# Patient Record
Sex: MALE | Race: BLACK OR AFRICAN AMERICAN
[De-identification: names, ages, dates, MRNs, and addresses within clinical notes are randomized per-mention and may not be internally consistent; named-entity substitution may affect disease eponyms.]

---

## 2018-12-27 ENCOUNTER — HOSPITAL ENCOUNTER (EMERGENCY)
Dept: HOSPITAL 35 - ER | Age: 63
Discharge: NURSING, REHAB OR LONG-TERM CARE | End: 2018-12-27
Payer: COMMERCIAL

## 2018-12-27 VITALS — WEIGHT: 295 LBS | HEIGHT: 71 IN | BODY MASS INDEX: 41.3 KG/M2

## 2018-12-27 VITALS — DIASTOLIC BLOOD PRESSURE: 61 MMHG | SYSTOLIC BLOOD PRESSURE: 116 MMHG

## 2018-12-27 DIAGNOSIS — R55: Primary | ICD-10-CM

## 2018-12-27 LAB
ANION GAP BLD CALC-SCNC: 13 MMOL/L (ref 7–16)
APTT BLD: 30.8 SECONDS (ref 24.5–32.8)
BUN BLD-MCNC: 21 MG/DL (ref 7–18)
CHLORIDE BLD-SCNC: 101 MMOL/L (ref 98–107)
CO2 SERPL-SCNC: 30 MMOL/L (ref 21–32)
CREAT SERPL-MCNC: 6.5 MG/DL (ref 0.6–1.3)
GLUCOSE BLD-MCNC: 152 MG/DL (ref 70–99)
HCT VFR BLD AUTO: 33 % (ref 42–52)
HGB BLD-MCNC: 11.2 G/DL (ref 14–18)
INR PPP: 1
POC CA IONIZED: 5 MG/DL (ref 4.5–5.3)
POTASSIUM SERPL-SCNC: 4 MMOL/L (ref 3.5–5.1)
PROTHROMBIN TIME: 10.8 SECONDS (ref 9.3–11.4)
SODIUM SERPL-SCNC: 139 MMOL/L (ref 136–145)

## 2019-01-22 ENCOUNTER — HOSPITAL ENCOUNTER (INPATIENT)
Dept: HOSPITAL 35 - ER | Age: 64
LOS: 2 days | Discharge: NURSING, REHAB OR LONG-TERM CARE | DRG: 314 | End: 2019-01-24
Attending: INTERNAL MEDICINE | Admitting: INTERNAL MEDICINE
Payer: COMMERCIAL

## 2019-01-22 VITALS — BODY MASS INDEX: 42.12 KG/M2 | HEIGHT: 60.98 IN | WEIGHT: 223.09 LBS

## 2019-01-22 VITALS — DIASTOLIC BLOOD PRESSURE: 64 MMHG | SYSTOLIC BLOOD PRESSURE: 110 MMHG

## 2019-01-22 VITALS — DIASTOLIC BLOOD PRESSURE: 68 MMHG | SYSTOLIC BLOOD PRESSURE: 120 MMHG

## 2019-01-22 VITALS — SYSTOLIC BLOOD PRESSURE: 96 MMHG | DIASTOLIC BLOOD PRESSURE: 54 MMHG

## 2019-01-22 VITALS — DIASTOLIC BLOOD PRESSURE: 57 MMHG | SYSTOLIC BLOOD PRESSURE: 97 MMHG

## 2019-01-22 VITALS — SYSTOLIC BLOOD PRESSURE: 96 MMHG | DIASTOLIC BLOOD PRESSURE: 60 MMHG

## 2019-01-22 VITALS — SYSTOLIC BLOOD PRESSURE: 115 MMHG | DIASTOLIC BLOOD PRESSURE: 74 MMHG

## 2019-01-22 VITALS — DIASTOLIC BLOOD PRESSURE: 26 MMHG | SYSTOLIC BLOOD PRESSURE: 50 MMHG

## 2019-01-22 DIAGNOSIS — I95.3: ICD-10-CM

## 2019-01-22 DIAGNOSIS — Z88.8: ICD-10-CM

## 2019-01-22 DIAGNOSIS — E11.649: ICD-10-CM

## 2019-01-22 DIAGNOSIS — N18.6: ICD-10-CM

## 2019-01-22 DIAGNOSIS — N40.0: ICD-10-CM

## 2019-01-22 DIAGNOSIS — J44.9: ICD-10-CM

## 2019-01-22 DIAGNOSIS — I12.0: ICD-10-CM

## 2019-01-22 DIAGNOSIS — E11.22: ICD-10-CM

## 2019-01-22 DIAGNOSIS — E87.8: ICD-10-CM

## 2019-01-22 DIAGNOSIS — E78.5: ICD-10-CM

## 2019-01-22 DIAGNOSIS — Z79.899: ICD-10-CM

## 2019-01-22 DIAGNOSIS — I95.89: Primary | ICD-10-CM

## 2019-01-22 LAB
ALBUMIN SERPL-MCNC: 2.8 G/DL (ref 3.4–5)
ALT SERPL-CCNC: 10 U/L (ref 30–65)
ANION GAP SERPL CALC-SCNC: 7 MMOL/L (ref 7–16)
ANISOCYTOSIS BLD QL SMEAR: (no result)
AST SERPL-CCNC: 15 U/L (ref 15–37)
BASOPHILS NFR BLD AUTO: 1.2 % (ref 0–2)
BILIRUB SERPL-MCNC: 0.3 MG/DL
BUN SERPL-MCNC: 25 MG/DL (ref 7–18)
CALCIUM SERPL-MCNC: 10.6 MG/DL (ref 8.5–10.1)
CHLORIDE SERPL-SCNC: 100 MMOL/L (ref 98–107)
CO2 SERPL-SCNC: 29 MMOL/L (ref 21–32)
CREAT SERPL-MCNC: 7.8 MG/DL (ref 0.7–1.3)
EOSINOPHIL NFR BLD: 2.9 % (ref 0–3)
ERYTHROCYTE [DISTWIDTH] IN BLOOD BY AUTOMATED COUNT: 19.9 % (ref 10.5–14.5)
GLUCOSE SERPL-MCNC: 132 MG/DL (ref 74–106)
GRANULOCYTES NFR BLD MANUAL: 58.3 % (ref 36–66)
HCT VFR BLD CALC: 39.1 % (ref 42–52)
HGB BLD-MCNC: 12.3 GM/DL (ref 14–18)
LYMPHOCYTES NFR BLD AUTO: 30.5 % (ref 24–44)
MCH RBC QN AUTO: 24.8 PG (ref 26–34)
MCHC RBC AUTO-ENTMCNC: 31.4 G/DL (ref 28–37)
MCV RBC: 78.7 FL (ref 80–100)
MONOCYTES NFR BLD: 7.1 % (ref 1–8)
NEUTROPHILS # BLD: 3.8 THOU/UL (ref 1.4–8.2)
PLATELET # BLD: 251 THOU/UL (ref 150–400)
POTASSIUM SERPL-SCNC: 4.4 MMOL/L (ref 3.5–5.1)
PROT SERPL-MCNC: 7.5 G/DL (ref 6.4–8.2)
RBC # BLD AUTO: 4.97 MIL/UL (ref 4.5–6)
SODIUM SERPL-SCNC: 136 MMOL/L (ref 136–145)
TROPONIN I SERPL-MCNC: <0.06 NG/ML (ref ?–0.06)
WBC # BLD AUTO: 6.6 THOU/UL (ref 4–11)

## 2019-01-22 PROCEDURE — 10879: CPT

## 2019-01-22 PROCEDURE — 32100 EXPLORATION OF CHEST: CPT

## 2019-01-22 NOTE — HC
[facility]
[address]
[place]   [address]                     CONSULTATION                  
_______________________________________________________________________________
 
Name:       WHITNEY NEAL                Room #:         362-P       ADM IN  
M.R.#:      [id number]                       Account #:      [id number]  
Admission:  01/22/19    Attend Phys:    Justin Blackman MD    
Discharge:              Date of Birth:  [birth year]  
                                                          Report #: [id number]
                                                                    [id number]   
_______________________________________________________________________________
THIS REPORT FOR:   //name//                          
 
CC: Justin Blackman
 
REASON FOR CONSULTATION:  End-stage renal disease.
 
REASON FOR PRESENTATION:  Hypertension.
 
HISTORY OF PRESENT ILLNESS:  The patient is a 63-year-old with past medical
history of diabetes mellitus and hypertension.  He tells me that he was started
on dialysis a few months ago while at [facility].  He does not really
recall what he was told about the cause of his end-stage renal disease; however,
I am assuming that this is due to diabetes mellitus and hypertension.  He is
utilizing a left IJ catheter.  He suffers from hypotensive episodes after his
dialysis and had what seems to be a fall, and unresponsive episode.  He was
brought to further evaluate to the Emergency Room and was found to be in extreme
hypotension with his blood pressure all the way down to 60/31.  He required
fluid resuscitation.  I am being consulted to manage his end-stage renal
disease.
 
PAST MEDICAL HISTORY:
1.  End-stage renal disease.
2.  Diabetes mellitus.
3.  Hypertension.
4.  History of BPH.
5.  COPD.
6.  Hyperlipidemia.
7.  First left IJ catheter placement.
 
MEDICATIONS:
1.  Atorvastatin.
2.  Insulin.
3.  Midodrine.
4.  Hydrocodone.
5.  Folic acid.
 
ALLERGIES:  PORT AND PORCINE CONTAINING PRODUCTS.
 
SOCIAL HISTORY:  Resides in the [facility].  No drug or alcohol abuse.
 
REVIEW OF SYSTEMS:
GENERAL:  No fever or chills, but significant for weakness.
CARDIOVASCULAR:  No chest pain.
PULMONARY:  No cough or hemoptysis.
GASTROINTESTINAL:  No nausea or vomiting.
GENITOURINARY:  No frequency, no urgency.
 
 
 
[facility]
[address] [place]   [address]                     CONSULTATION                  
_______________________________________________________________________________
 
Name:       WHITNEY NEAL                Room #:         362-P       [facility] [place]  
.R.#:      [id number]                       Account #:      [id number]  
Admission:  01/22/19    Attend Phys:    Justin Blackman MD    
Discharge:              Date of Birth:  [birth year]  
                                                          Report #: [id number]
                                                                    [id number]   
_______________________________________________________________________________
NEUROLOGICAL:  As per the history of present illness.
 
PHYSICAL EXAMINATION:
VITAL SIGNS:  Temperature 36.9, blood pressure 121/56.
HEAD AND NECK:  No jugular venous distension.  Left IJ catheter present.
CHEST:  No crackles.
CARDIOVASCULAR:  No rub.
ABDOMEN:  Soft, nontender.
LOWER EXTREMITIES:  No edema.
 
LABORATORY VALUES:  Reviewed.  Hemoglobin 12.3.  Sodium 136, potassium 4.4, BUN
25, creatinine 7.8.
 
ASSESSMENT, IMPRESSION AND PLAN:
1.  End-stage renal disease.
2.  Hypotension related to aggressive ultrafiltration.
3.  Diabetes mellitus.
4.  Hypertension.
5.  We will arrange for the patient to have his usual hemodialysis today. 
Orders are in.  No aggressive ultrafiltration.  Back off his blood pressure
medication.  Okay to discharge after dialysis.
 
 
 
 
 
 
 
 
 
 
 
 
 
 
 
 
 
 
 
 
 
 
 
                         
   By:                               
                   
D: 01/23/19 0958                           _____________________________________
T: 01/23/19 1126                           Jaguar Philippe MD            /nt

## 2019-01-22 NOTE — NUR
PT ARRIVED TO FLOOR AT 1410. A/O X 4. PT BP STABLE ON ARRIVAL. ALBUMIN
INFUSING VIA IV. PT DENIES ANY ACUTE PAIN, ONLY CHRONIC PAIN IN JOINTS PER PT.
SISTER,SONU, UPDATED VIA PHONE. ADMISSION DOCUMENTATION COMPLETE.
SKIN BREAKDOWN UNDER PANIS NOTED ON ARRIVAL AND PICTURE PLACED IN CHART.
BG CHECK ON ARRIVAL AT 61, GIVEN APPLE JUICE AND RECHECKED. PT STABLE.
AWAITING ADMISSION ORDERS VIA DR ANTUNEZ. WILL CONT TO MONITOR AND FOLLOW POC.

## 2019-01-22 NOTE — EKG
[place] [address] [facility] [employer]
[place]  [address]
Phone:  [phone]                    ELECTROCARDIOGRAM REPORT      
_______________________________________________________________________________
 
Name:       ANDRIA NEALANTONIA                Room #:         362-P       ADM IN  
M.R.#:      [id number]     Account #:      [id number]  
Admission:  19    Attend Phys:    Justin Blackman MD    
Discharge:              Date of Birth:  [birth year]  
                                                          Report #: [id number]
   [id number]
_______________________________________________________________________________
THIS REPORT FOR:   //name//                          
 
                         [facility] ED
                                       
Test Date:    2019               Test Time:    10:10:37
Pat Name:     WHITNEY NEAL           Department:   
Patient ID:   [id number]            Room:         362
Gender:       M                        Technician:   park amaro
:          1955               Requested By: Katlyn Pearce
Order Number: [id number] MD:   Dain Delacruz
                                 Measurements
Intervals                              Axis          
Rate:         76                       P:            43
GA:           168                      QRS:          -11
QRSD:         104                      T:            33
QT:           416                                    
QTc:          468                                    
                           Interpretive Statements
Sinus rhythm
Abnormal R-wave progression, late transition
Compared to ECG 2018 10:11:12
No significant changes
 
Electronically Signed On 2019 16:12:08 CST by Dain Delacruz
[email or web address]
 
 
 
 
 
 
 
 
 
 
 
 
 
 
 
 
 
 
  <ELECTRONICALLY SIGNED>
   By: Dain Delacruz MD, [facility]   
  19     1612
D: 19 1010                           _____________________________________
T: 19 1010                           Dain Delacruz MD, [facility]     /EPI

## 2019-01-23 VITALS — DIASTOLIC BLOOD PRESSURE: 65 MMHG | SYSTOLIC BLOOD PRESSURE: 103 MMHG

## 2019-01-23 VITALS — SYSTOLIC BLOOD PRESSURE: 119 MMHG | DIASTOLIC BLOOD PRESSURE: 59 MMHG

## 2019-01-23 VITALS — DIASTOLIC BLOOD PRESSURE: 77 MMHG | SYSTOLIC BLOOD PRESSURE: 109 MMHG

## 2019-01-23 VITALS — DIASTOLIC BLOOD PRESSURE: 56 MMHG | SYSTOLIC BLOOD PRESSURE: 121 MMHG

## 2019-01-23 VITALS — DIASTOLIC BLOOD PRESSURE: 66 MMHG | SYSTOLIC BLOOD PRESSURE: 124 MMHG

## 2019-01-23 VITALS — SYSTOLIC BLOOD PRESSURE: 135 MMHG | DIASTOLIC BLOOD PRESSURE: 91 MMHG

## 2019-01-23 PROCEDURE — 5A1D70Z PERFORMANCE OF URINARY FILTRATION, INTERMITTENT, LESS THAN 6 HOURS PER DAY: ICD-10-PCS | Performed by: INTERNAL MEDICINE

## 2019-01-23 NOTE — NUR
ASSUMED PATIENT CARE AT 0715.  A&OX3-4. NO COMPLAINTS OF PAIN.  PLANNING ON
DIALYSIS TODAY WITH DISCHARGE BACK TO FACILITY FOLLOWING AS LONG AS PATIENT IS
STABLE POST DIALYSIS.  MAX ASSIST.  NEEDS HELP REPOSITIONING IN BED.  NEW RUE
FISTULA WITH POSITIVE BRUIT AND THRILL.

## 2019-01-23 NOTE — NUR
INITIAL ASSESSMENT/DISCHARGE NOTE:
Received high risk nursing referral due to pt being admitted from a nursing
facility. CARL reviewed chart and spoke with nursing. Pt was admitted from
[facility]. Pt with hx of ESRD and does dialysis at [facility]. Pt will d/c back to [facility] after dialysis. CARL met with pt at bedside.
Introduced role of SW. Pt is alert/oriented. Pt is agreeable with plan to
return to [facility] later today. Awaiting dialysis at this time. CARL spoke with
May at [facility] to notify of pt's return. Dialysis flow sheets and
discharge orders/summary will need to be faxed when available. CARL spoke with
pt's sister, Sabrina, via phone to provide update and notify of anticipated
discharge. Sabrina is agreeable with d/c plan. Discharge planner faxed info
to [facility].  Pt will need stretcher van transportation. CARL is following to
assist as needed with discharge planning.
[facility]--Phone: [phone]  Fax: [phone]  or [phone]
[facility]--Phone: [phone]   Fax: [phone]

## 2019-01-23 NOTE — NUR
PATIENT IS ALERT AND ORIENTED WITH A THICK ACCENT. PATIENT IS ANURIC. PATIENT
IS ON DIAYLISIS MWF. PATIENT HAS A RT ARM FISTULA AND LT CHEST DIAYLISIS CATH.
FISTULA IS NEW. PATIENT CANT EAT PORK PER [employer]. PATIENT IS INCONTIENT.
PATIENT IS NSR ON TELE. PATIENT DENIES PAIN. PATIENT IS RESTING COMFORTABLEY
IN BED. WCM. PATIENT IS PROGRESSING TO GOALS.

## 2019-01-23 NOTE — NUR
DP CALLED BRENDA/VLADIMIR CAREY TO LET HER KNOW PATIENT WILL GO
BACK TO  AFTER DIALYSIS LATER TODAY.

## 2019-01-24 VITALS — SYSTOLIC BLOOD PRESSURE: 104 MMHG | DIASTOLIC BLOOD PRESSURE: 68 MMHG

## 2019-01-24 VITALS — DIASTOLIC BLOOD PRESSURE: 58 MMHG | SYSTOLIC BLOOD PRESSURE: 108 MMHG

## 2019-01-24 VITALS — SYSTOLIC BLOOD PRESSURE: 100 MMHG | DIASTOLIC BLOOD PRESSURE: 56 MMHG

## 2019-01-24 VITALS — SYSTOLIC BLOOD PRESSURE: 105 MMHG | DIASTOLIC BLOOD PRESSURE: 90 MMHG

## 2019-01-24 NOTE — NUR
DISCHARGE ORDERS RECEIVED.  PATIENT DISCHARGING TO [facility].  CHART
COPIED PER UNIT SECRETARY.  ORDERS FAXED TO [facility]
ADMISSIONS.  ORDERS VERIFIED RECEIVED.  TRANSPORTATION PER , 1630 HOURS.
SISTER SONU NOTIFIED.  UNIT RN NOTIFIED AND CONTACT NUMBER PROVIDED FOR
REPORT.  UNIT CM/SW AWARE.

## 2019-01-24 NOTE — NUR
DISCHARGE NOTE:
SW reviewed chart and spoke with nursing. Pt was not done with dialysis until
late last night. Pt is medically stable for discharge back to [facility]
today. Discharge planner coordinated and spoke with family. No additional SW
needs identified at this time, but is available to assist should needs arise.

## 2019-01-24 NOTE — NUR
PT IS A/O TIMES FOUR, D/C ORDERS IN PLACE, PT PICKED UP AT 1620, REPPORT
CALLED TO ACCEPTING FACILITY AND ALL QUESTIONS ANSWERED TO ACCEPTING NURSE. PT
PICKED UP WC TRANSPORTS.

## 2019-01-24 NOTE — NUR
Pt. dialyzed last night till approximately 2300 and dialysis RN stated she
pulled out about 1L. Tolerating room air well. Denies any concern at this
time. He slept well during the night. BP stable. Progressing towards care plan
goals.

## 2019-12-26 ENCOUNTER — HOSPITAL ENCOUNTER (OUTPATIENT)
Dept: HOSPITAL 35 - CAT | Age: 64
End: 2019-12-26
Attending: INTERNAL MEDICINE
Payer: COMMERCIAL

## 2019-12-26 DIAGNOSIS — G31.9: Primary | ICD-10-CM

## 2019-12-26 DIAGNOSIS — I67.82: ICD-10-CM

## 2021-04-09 ENCOUNTER — HOSPITAL ENCOUNTER (EMERGENCY)
Dept: HOSPITAL 35 - ER | Age: 66
Discharge: NURSING, REHAB OR LONG-TERM CARE | End: 2021-04-09
Payer: COMMERCIAL

## 2021-04-09 VITALS — WEIGHT: 250 LBS | HEIGHT: 72 IN | BODY MASS INDEX: 33.86 KG/M2

## 2021-04-09 VITALS — DIASTOLIC BLOOD PRESSURE: 71 MMHG | SYSTOLIC BLOOD PRESSURE: 121 MMHG

## 2021-04-09 DIAGNOSIS — Z79.899: ICD-10-CM

## 2021-04-09 DIAGNOSIS — R07.89: Primary | ICD-10-CM

## 2021-04-09 DIAGNOSIS — J44.9: ICD-10-CM

## 2021-04-09 DIAGNOSIS — E11.22: ICD-10-CM

## 2021-04-09 DIAGNOSIS — N18.5: ICD-10-CM

## 2021-04-09 DIAGNOSIS — Z91.018: ICD-10-CM

## 2021-04-09 DIAGNOSIS — I12.0: ICD-10-CM

## 2021-04-09 DIAGNOSIS — Z79.82: ICD-10-CM

## 2021-04-09 DIAGNOSIS — Z99.2: ICD-10-CM

## 2021-04-09 LAB
ANION GAP SERPL CALC-SCNC: 11 MMOL/L (ref 7–16)
BASOPHILS NFR BLD AUTO: 0.5 % (ref 0–2)
BUN SERPL-MCNC: 29 MG/DL (ref 7–18)
CALCIUM SERPL-MCNC: 9 MG/DL (ref 8.5–10.1)
CHLORIDE SERPL-SCNC: 94 MMOL/L (ref 98–107)
CO2 SERPL-SCNC: 31 MMOL/L (ref 21–32)
CREAT SERPL-MCNC: 7.3 MG/DL (ref 0.7–1.3)
EOSINOPHIL NFR BLD: 2 % (ref 0–3)
ERYTHROCYTE [DISTWIDTH] IN BLOOD BY AUTOMATED COUNT: 16.6 % (ref 10.5–14.5)
GLUCOSE SERPL-MCNC: 109 MG/DL (ref 74–106)
GRANULOCYTES NFR BLD MANUAL: 75.4 % (ref 36–66)
HCT VFR BLD CALC: 44.1 % (ref 42–52)
HGB BLD-MCNC: 14.4 GM/DL (ref 14–18)
LYMPHOCYTES NFR BLD AUTO: 15.9 % (ref 24–44)
MCH RBC QN AUTO: 27.1 PG (ref 26–34)
MCHC RBC AUTO-ENTMCNC: 32.6 G/DL (ref 28–37)
MCV RBC: 83.3 FL (ref 80–100)
MONOCYTES NFR BLD: 6.2 % (ref 1–8)
NEUTROPHILS # BLD: 7.4 THOU/UL (ref 1.4–8.2)
PLATELET # BLD: 250 THOU/UL (ref 150–400)
POTASSIUM SERPL-SCNC: 4.4 MMOL/L (ref 3.5–5.1)
RBC # BLD AUTO: 5.3 MIL/UL (ref 4.5–6)
SODIUM SERPL-SCNC: 136 MMOL/L (ref 136–145)
TROPONIN I SERPL-MCNC: <0.06 NG/ML (ref ?–0.06)
WBC # BLD AUTO: 9.8 THOU/UL (ref 4–11)

## 2021-04-09 NOTE — EKG
[place] [address] [facility] [address]
[place]  [address]
Phone:  [phone]                    ELECTROCARDIOGRAM REPORT      
_______________________________________________________________________________
 
Name:       WHITNEY NEAL                [facility]                     [id number]     Account #:      [id number]  
Admission:  21    Attend Phys:                          
Discharge:              Date of Birth:  [birth year]  
                                                          Report #: [id number]
   [id number]
_______________________________________________________________________________
                         [facility] ED
                                       
Test Date:    2021               Test Time:    14:08:03
Pat Name:     WHITNEY NEAL           Department:   
Patient ID:   [id number]            Room:          
Gender:       M                        Technician:   RITCHIE
:          1955               Requested By: Naldo Caldera
Order Number: [id number] MD:   Milton Lezama
                                 Measurements
Intervals                              Axis          
Rate:         101                      P:            
IN:                                    QRS:          -26
QRSD:         102                      T:            74
QT:           339                                    
QTc:          440                                    
                           Interpretive Statements
Sinus rhyth wit PAC
Probable  left ventricular hypertrophy
Compared to ECG 2019 10:10:37
Sinus rhythm no longer present
Electronically Signed On 2021 14:55:27 CDT by Milton Lezama
[email or web address]
 
 
 
 
 
 
 
 
 
 
 
 
 
 
 
 
 
 
 
 
 
  <ELECTRONICALLY SIGNED>
   By: Milton Lezama MD        
  21     1455
D: 21 1408                           _____________________________________
T: 21 1408                           MD ARASH Miranda

## 2021-04-11 NOTE — EKG
[place] [address] [address]
[place]  [address]
Phone:  [phone]                    ELECTROCARDIOGRAM REPORT      
_______________________________________________________________________________
 
Name:       WHITNEY NEAL                [facility] #:                     [facility]      [id number]     Account #:      [id number]  
Admission:  21    Attend Phys:                          
Discharge:  21    Date of Birth:  [birth year]  
                                                          Report #: [id number]
   [id number]
_______________________________________________________________________________
                         [facility] ED
                                       
Test Date:    2021               Test Time:    19:01:10
Pat Name:     WHITNEY NEAL           Department:   
Patient ID:   [id number]            Room:          
Gender:       M                        Technician:   cu
:          1955               Requested By: Naldo Caldera
Order Number: [id number] MD:   Milton Lezama
                                 Measurements
Intervals                              Axis          
Rate:         74                       P:            16
NE:           161                      QRS:          -33
QRSD:         104                      T:            52
QT:           414                                    
QTc:          460                                    
                           Interpretive Statements
Sinus rhythm
Abnormal R-wave progression, late transition
Left ventricular hypertrophy
Compared to ECG 2021 14:08:03
Atrial premature complex(es) no longer present
Electronically Signed On 2021 11:03:14 CDT by Milton Lezama
[email or web address]
 
 
 
 
 
 
 
 
 
 
 
 
 
 
 
 
 
 
 
 
  <ELECTRONICALLY SIGNED>
   By: Milton Lezama MD        
  21     1103
D: 21                           _____________________________________
T: 21                           Milton Lezama MD          /LORENA

## 2021-04-28 ENCOUNTER — HOSPITAL ENCOUNTER (INPATIENT)
Dept: HOSPITAL 35 - ER | Age: 66
LOS: 3 days | Discharge: NURSING, REHAB OR LONG-TERM CARE | DRG: 280 | End: 2021-05-01
Attending: INTERNAL MEDICINE | Admitting: INTERNAL MEDICINE
Payer: COMMERCIAL

## 2021-04-28 VITALS — DIASTOLIC BLOOD PRESSURE: 113 MMHG | SYSTOLIC BLOOD PRESSURE: 160 MMHG

## 2021-04-28 VITALS — SYSTOLIC BLOOD PRESSURE: 140 MMHG | DIASTOLIC BLOOD PRESSURE: 96 MMHG

## 2021-04-28 VITALS — DIASTOLIC BLOOD PRESSURE: 87 MMHG | SYSTOLIC BLOOD PRESSURE: 152 MMHG

## 2021-04-28 VITALS — DIASTOLIC BLOOD PRESSURE: 96 MMHG | SYSTOLIC BLOOD PRESSURE: 140 MMHG

## 2021-04-28 VITALS — WEIGHT: 240 LBS | BODY MASS INDEX: 39.99 KG/M2 | HEIGHT: 65 IN

## 2021-04-28 VITALS — SYSTOLIC BLOOD PRESSURE: 130 MMHG | DIASTOLIC BLOOD PRESSURE: 93 MMHG

## 2021-04-28 DIAGNOSIS — M25.562: ICD-10-CM

## 2021-04-28 DIAGNOSIS — I69.354: ICD-10-CM

## 2021-04-28 DIAGNOSIS — D63.8: ICD-10-CM

## 2021-04-28 DIAGNOSIS — R25.2: ICD-10-CM

## 2021-04-28 DIAGNOSIS — E66.9: ICD-10-CM

## 2021-04-28 DIAGNOSIS — I21.4: Primary | ICD-10-CM

## 2021-04-28 DIAGNOSIS — E78.5: ICD-10-CM

## 2021-04-28 DIAGNOSIS — N18.6: ICD-10-CM

## 2021-04-28 DIAGNOSIS — N25.81: ICD-10-CM

## 2021-04-28 DIAGNOSIS — E88.89: ICD-10-CM

## 2021-04-28 DIAGNOSIS — Z79.899: ICD-10-CM

## 2021-04-28 DIAGNOSIS — I95.89: ICD-10-CM

## 2021-04-28 DIAGNOSIS — E11.22: ICD-10-CM

## 2021-04-28 DIAGNOSIS — Z79.82: ICD-10-CM

## 2021-04-28 DIAGNOSIS — I12.0: ICD-10-CM

## 2021-04-28 DIAGNOSIS — G93.41: ICD-10-CM

## 2021-04-28 DIAGNOSIS — E11.42: ICD-10-CM

## 2021-04-28 LAB
ALBUMIN SERPL-MCNC: 3.2 G/DL (ref 3.4–5)
ALT SERPL-CCNC: 72 U/L (ref 16–63)
ANION GAP SERPL CALC-SCNC: 9 MMOL/L (ref 7–16)
AST SERPL-CCNC: 152 U/L (ref 15–37)
BASOPHILS NFR BLD AUTO: 0 % (ref 0–2)
BILIRUB SERPL-MCNC: 1.4 MG/DL (ref 0.2–1)
BUN SERPL-MCNC: 22 MG/DL (ref 7–18)
CALCIUM SERPL-MCNC: 8.6 MG/DL (ref 8.5–10.1)
CHLORIDE SERPL-SCNC: 95 MMOL/L (ref 98–107)
CO2 SERPL-SCNC: 30 MMOL/L (ref 21–32)
CREAT SERPL-MCNC: 6 MG/DL (ref 0.7–1.3)
EOSINOPHIL NFR BLD: 0 % (ref 0–3)
ERYTHROCYTE [DISTWIDTH] IN BLOOD BY AUTOMATED COUNT: 16.6 % (ref 10.5–14.5)
GLUCOSE SERPL-MCNC: 109 MG/DL (ref 74–106)
GRANULOCYTES NFR BLD MANUAL: 77 % (ref 36–66)
HCT VFR BLD CALC: 37.7 % (ref 42–52)
HGB BLD-MCNC: 12.4 GM/DL (ref 14–18)
LYMPHOCYTES NFR BLD AUTO: 20 % (ref 24–44)
MAGNESIUM SERPL-MCNC: 1.8 MG/DL (ref 1.8–2.4)
MCH RBC QN AUTO: 27.3 PG (ref 26–34)
MCHC RBC AUTO-ENTMCNC: 32.9 G/DL (ref 28–37)
MCV RBC: 83.1 FL (ref 80–100)
MONOCYTES NFR BLD: 3 % (ref 1–8)
NEUTROPHILS # BLD: 7.6 THOU/UL (ref 1.4–8.2)
PHOSPHATE SERPL-MCNC: 4.2 MG/DL (ref 2.5–4.9)
PLATELET # BLD EST: NORMAL 10*3/UL
PLATELET # BLD: 220 THOU/UL (ref 150–400)
POTASSIUM SERPL-SCNC: 5.7 MMOL/L (ref 3.5–5.1)
PROT SERPL-MCNC: 8.6 G/DL (ref 6.4–8.2)
RBC # BLD AUTO: 4.54 MIL/UL (ref 4.5–6)
RBC MORPH BLD: NORMAL
SODIUM SERPL-SCNC: 134 MMOL/L (ref 136–145)
TROPONIN I SERPL-MCNC: 0.14 NG/ML (ref ?–0.06)
WBC # BLD AUTO: 9.9 THOU/UL (ref 4–11)

## 2021-04-28 PROCEDURE — 32100 EXPLORATION OF CHEST: CPT

## 2021-04-28 PROCEDURE — 10081 I&D PILONIDAL CYST COMP: CPT

## 2021-04-28 NOTE — NUR
PATIENT ADMIT FROM ED AT 1653. PANTS, SHIRT, JACKET, SHOES, CELL PHONE, AND
WALLET IN DUFFLE BAG. SISTER, SARAHI, CALLED BY PATIENT ON CELL PHONE. THIS
NURSE UPDATED THE SISTER ABOUT THE ADMISSION. PATIENT IS VERY POOR HISTORIAN,
MED REC AND ADMISSION HISTORY COMPLETED FROM DIALYSIS CENTER PAPERWORK.

## 2021-04-28 NOTE — EKG
[place] [address] [facility] [address]
[place]  [address]
Phone:  [phone]                    ELECTROCARDIOGRAM REPORT      
_______________________________________________________________________________
 
Name:       VAL OLSON                [facility]:                     [id number]     Account #:      [id number]  
Admission:  21    Attend Phys:                          
Discharge:              Date of Birth:  [birth year]  
                                                          Report #: [id number]
   [id number]
_______________________________________________________________________________
                         [facility] ED
                                       
Test Date:    2021               Test Time:    11:12:30
Pat Name:     VAL OLSON           Department:   
Patient ID:   [id number]            Room:          
Gender:       M                        Technician:   RITA
:          1955               Requested By: Lokesh Hernandez
Order Number: [id number] MD:   Dain Delacruz
                                 Measurements
Intervals                              Axis          
Rate:         84                       P:            51
NY:           152                      QRS:          -31
QRSD:         110                      T:            67
QT:           404                                    
QTc:          478                                    
                           Interpretive Statements
Sinus rhythm
Abnormal R-wave progression, late transition
Borderline prolonged QT interval
No previous ECG available for comparison
Electronically Signed On 2021 12:24:48 CDT by Dain Delacruz
[email or web address]
 
 
 
 
 
 
 
 
 
 
 
 
 
 
 
 
 
 
 
 
 
  <ELECTRONICALLY SIGNED>
   By: Dain Delacruz MD, [facility]   
  21     1224
D: 21 1112                           _____________________________________
T: 21 1112                           Dain Delacruz MD, [facility]     /EPI

## 2021-04-29 VITALS — SYSTOLIC BLOOD PRESSURE: 120 MMHG | DIASTOLIC BLOOD PRESSURE: 72 MMHG

## 2021-04-29 VITALS — SYSTOLIC BLOOD PRESSURE: 103 MMHG | DIASTOLIC BLOOD PRESSURE: 53 MMHG

## 2021-04-29 VITALS — SYSTOLIC BLOOD PRESSURE: 152 MMHG | DIASTOLIC BLOOD PRESSURE: 98 MMHG

## 2021-04-29 VITALS — SYSTOLIC BLOOD PRESSURE: 120 MMHG | DIASTOLIC BLOOD PRESSURE: 84 MMHG

## 2021-04-29 VITALS — DIASTOLIC BLOOD PRESSURE: 64 MMHG | SYSTOLIC BLOOD PRESSURE: 109 MMHG

## 2021-04-29 LAB
ALBUMIN SERPL-MCNC: 3.1 G/DL (ref 3.4–5)
ANION GAP SERPL CALC-SCNC: 11 MMOL/L (ref 7–16)
BUN SERPL-MCNC: 32 MG/DL (ref 7–18)
CALCIUM SERPL-MCNC: 8.8 MG/DL (ref 8.5–10.1)
CHLORIDE SERPL-SCNC: 94 MMOL/L (ref 98–107)
CHOLEST SERPL-MCNC: 114 MG/DL (ref ?–200)
CO2 SERPL-SCNC: 28 MMOL/L (ref 21–32)
CREAT SERPL-MCNC: 7.6 MG/DL (ref 0.7–1.3)
GLUCOSE SERPL-MCNC: 91 MG/DL (ref 74–106)
HDLC SERPL-MCNC: 48 MG/DL (ref 40–?)
LDLC SERPL-MCNC: 43 MG/DL (ref ?–100)
MAGNESIUM SERPL-MCNC: 1.7 MG/DL (ref 1.8–2.4)
PHOSPHATE SERPL-MCNC: 4.4 MG/DL (ref 2.5–4.9)
POTASSIUM SERPL-SCNC: 4.4 MMOL/L (ref 3.5–5.1)
SODIUM SERPL-SCNC: 133 MMOL/L (ref 136–145)
TC:HDL: 2.4 RATIO
TRIGL SERPL-MCNC: 116 MG/DL (ref ?–150)
TROPONIN I SERPL-MCNC: 0.44 NG/ML (ref ?–0.06)
VLDLC SERPL CALC-MCNC: 23 MG/DL (ref ?–40)

## 2021-04-29 NOTE — 2DMMODE
[facility]
[place]   [address]                   2 D/M-MODE ECHOCARDIOGRAM     
_______________________________________________________________________________
 
Name:       WHITNEY NEAL                Room #:         207-P       ADM IN  
M.R.#:      [id number]                       Account #:      [id number]  
Admission:  21    Attend Phys:    Arpit Yao MD      
Discharge:              Date of Birth:  [birth year]  
                                                          Report #: [id number]
                                                                    [id number]
_______________________________________________________________________________
THIS REPORT FOR:  
 
cc:  Justin Blackman MD, Ramilo MD Lammoglia, Francisco J. MD                                        
                                                                       ~
 
--------------- APPROVED REPORT --------------
 
 
Study performed:  2021 10:29:39
 
EXAM: Comprehensive 2D, Doppler, and color-flow 
Echocardiogram 
Patient Location: Bedside   
Room #:  207     Status:  routine
 
       BSA:         2.17
HR: 100 bpm  BP:          152/98 mmHg 
Rhythm: Tachy/PVCs     
 
Other Information 
Study Quality: Good
Technically limited study due to  heavy snoring, morbid 
obesity.
 
Indications
Chest pain, NSTEMI.
Hx: CVA, ESRD, HTN, HLP, DM.
 
2D Dimensions
RVDd:  29.00 mm   
IVSd:  15.00 (7-11mm)  LVOT Diam:  21.00 (18-24mm) 
LVDd:  46.00 mm   
PWd:  13.00 (7-11mm)  Ascending Ao:  37.00 (22-36mm)
LVDs:  32.00 (25-40mm)  
Left Atrium:  37.00 (27-40mm) 
Aortic Root:  35.00 mm  
 
Volumes
Left Atrial Volume (Systole) 
Single Plane 4CH:  53.92 mL Single Plane 2CH:  49.19 mL
 
Aortic Valve
AoV Peak Yo.:  3.13 m/s 
AO Peak Gr.:  39.16 mmHg LVOT Max P.08 mmHg
 
 
[facility]
[address]
[place]  [address]
Phone:  [phone]                    2 D/M-MODE ECHOCARDIOGRAM     
_______________________________________________________________________________
 
Name:            WHITNEY NEAL                Room #:        207-P       ADM IN
M.R.#:           [id number]          Account #:     [id number]  
Admission:       21         Attend Phys:   Arpit Yao MD  
Discharge:                  Date of Birth: [birth year]  
                         Report #:      [id number]
        [id number]
_______________________________________________________________________________
AO Mean Gr.:  25.12 mmHg 
AO V2 Mean:  2.41 m/s  LVOT Max V:  1.13 m/s
AO V2 VTI:  50.03 cm  
YAMILE Vmax: 1.24 cm2  
 
Mitral Valve
    E/A Ratio:  0.6
    MV Decel. Time:  162.84 ms
MV E Max Yo.:  0.77 m/s 
MV A Yo.:  1.28 m/s  
MV PHT:  47.22 ms  
IVRT:  71.51 ms   
 
Pulmonary Valve
PV Peak Yo.:  1.21 m/s PV Peak Gr.:  5.90 mmHg
 
Tricuspid Valve
TR Peak Yo.:  2.38 m/s  
TR Peak Gr.:  23.00 mmHg 
 
Left Ventricle
The left ventricle is normal size. There is normal LV segmental wall 
motion. Moderate concentric left ventricular hypertrophy. Left 
ventricular systolic function is normal. LVEF is 60-65%. Mild 
diastolic dysfunction is present.
 
Right Ventricle
The right ventricle is normal size. The right ventricular systolic 
function is normal.
 
Atria
The left atrium size is normal. The right atrium size is 
normal.
 
Aortic Valve
The aortic valve is normal in structure. Leaflets are moderately 
calcified. Trace to mild aortic regurgitation. There is moderate 
valvular aortic stenosis. Calculated aortic valve area is 1.2 cm2 
with maximum pressure gradient of 39 mmHg and mean pressure gradient 
of 25 mmHg.
 
Mitral Valve
Mitral valve leaflets are mildly thickened. There is no mitral valve 
regurgitation noted. No evidence of mitral valve stenosis.
 
Tricuspid Valve
 
 
[facility]
[address]
[place]  [address]
Phone:  [phone]                    2 D/M-MODE ECHOCARDIOGRAM     
_______________________________________________________________________________
 
Name:            WHITNEY NEAL                Room #:        207-P       ADM IN
M.R.#:           [id number]          Account #:     [id number]  
Admission:       21         Attend Phys:   Arpit Yao MD  
Discharge:                  Date of Birth: [birth year]  
                         Report #:      [id number]
        [id number]
_______________________________________________________________________________
The tricuspid valve is normal in structure. Trace tricuspid 
regurgitation. Estimated PAP is 23mmHg plus the RA 
pressure.
 
Pulmonic Valve
The pulmonary valve is normal in structure. Trace pulmonic 
regurgitation.
 
Great Vessels
The aortic root is normal in size. The ascending aorta is normal in 
size. IVC is not well visualized.
 
Pericardium
There is no pericardial effusion.
 
<Conclusion>
The left ventricle is normal size.
Moderate concentric left ventricular hypertrophy.
LVEF is 60-65%.
The aortic valve is normal in structure. Leaflets are moderately 
calcified.
Trace to mild aortic regurgitation.
There is moderate valvular aortic stenosis. 
Calculated aortic valve area is 1.2 cm2 with maximum pressure 
gradient of 39 mmHg and mean pressure gradient of 25 mmHg.
Mitral valve leaflets are mildly thickened.
There is no mitral valve regurgitation noted.
The tricuspid valve is normal in structure.
Trace tricuspid regurgitation. Estimated PAP is 23mmHg plus the RA 
pressure.
The pulmonary valve is normal in structure.
Trace pulmonic regurgitation.
There is no pericardial effusion.
 
 
 
 
 
 
 
 
 
 
 
  <ELECTRONICALLY SIGNED>
   By: Can Horan MD    
  21     1128
D: 21 1128                           _____________________________________
T: 21 1128                           Can Horan MD      /INF

## 2021-04-29 NOTE — NUR
ASSUME CARE 1900. PT/VITALS STABLE. INTERMITTENT LEFT KNWW PAIN. POOR
TOLERANCE TO ACTIVITY. WOULD BENEFIT FROM PT/OT EVAL AND TX. ST NOTED ON
MONITOR. NO DISTRESS NOTED. VITALS STABLE/ PT NPO SINCE MIDNIGHT FOR VIKASH MOLINA.
PLAN IS FOR NEPHROLOGY AND CARDIOLOGY TO FOLLOW WITH PT. WOULD CONTINUE WITH
POC PER CARDIO AND NEPHRO.

## 2021-04-29 NOTE — NUR
Case opened to follow for dc planning. Writer visited with the pt at bedside
and spoke with the admissions liason at [facility]. The pt is a long
term care resident there since his stroke in 2019. He also has ESRD and
dialyzes at [facility].  He was sent from the clinic to the ER for
chest pain and NSTEMI. PLans for ccath lab tomorrow and dialysis discussed
with the care team. The pt and his sister Elo are aware and anticipating dc
back to ltc at [facility] over the weekend. [facility] is holding his bed and can
accept him when medically stable. The pt [place] medicaid for ins but
they are checking to see if he now has Medicare since he is 66 yrs old and
disabled more than 2yrs. He is up to the w/c at the facility and able to move
about in his w/c. Cm role introduced to the pt and his sister Cassie via phone.
Will follow.

## 2021-04-30 VITALS — SYSTOLIC BLOOD PRESSURE: 114 MMHG | DIASTOLIC BLOOD PRESSURE: 81 MMHG

## 2021-04-30 VITALS — DIASTOLIC BLOOD PRESSURE: 63 MMHG | SYSTOLIC BLOOD PRESSURE: 108 MMHG

## 2021-04-30 VITALS — SYSTOLIC BLOOD PRESSURE: 108 MMHG | DIASTOLIC BLOOD PRESSURE: 63 MMHG

## 2021-04-30 VITALS — SYSTOLIC BLOOD PRESSURE: 133 MMHG | DIASTOLIC BLOOD PRESSURE: 72 MMHG

## 2021-04-30 VITALS — DIASTOLIC BLOOD PRESSURE: 79 MMHG | SYSTOLIC BLOOD PRESSURE: 136 MMHG

## 2021-04-30 PROCEDURE — 5A1D70Z PERFORMANCE OF URINARY FILTRATION, INTERMITTENT, LESS THAN 6 HOURS PER DAY: ICD-10-PCS

## 2021-04-30 PROCEDURE — B2111ZZ FLUOROSCOPY OF MULTIPLE CORONARY ARTERIES USING LOW OSMOLAR CONTRAST: ICD-10-PCS | Performed by: INTERNAL MEDICINE

## 2021-04-30 PROCEDURE — 4A023N8 MEASUREMENT OF CARDIAC SAMPLING AND PRESSURE, BILATERAL, PERCUTANEOUS APPROACH: ICD-10-PCS | Performed by: INTERNAL MEDICINE

## 2021-04-30 NOTE — NUR
SLEPT MOST OF SHIFT. TURNS SELF WITHOUT ASSIST. PLAN FOR AM HEART CATH AND
DIALYSIS IN AFTERNOON. NO PRESENT COMPLAINTS OF PAIN. WORKING ON GOALS AND
PLAN OF CARE FOR NOC. CONTINUE TO ASSES AS NEEDED.

## 2021-04-30 NOTE — NUR
ASSUMED CARE SHIFT CHANGE. ASSESSMENTS CHARTED.MEDS GIVEN. VSS. HEART CATH
THIS SHIFT, DIAGNOSTIC, NO FINDINGS. R ANDRIY CDI. NO HEMATOMA. DIALYSIS THIS
SHIFT TOLERATING WELL. PLAN FOR RETURN TO [facility] ONCE MEDICALLY STABLE.
CONTINUING POC. WILL PASS ON REPORT TO NOC RN.

## 2021-04-30 NOTE — NUR
Pt having heart cath and dialysis today. Jf chavez notified of
possible dc back to long term care this weekend. Should the pt be dc ready
this weekend, unit Rn to call their liason Lisa at [phone] to arrange
w/c van transport. Dc instructions and summary will need to be faxed to
[phone] and a chart copy sent with the pt. His sister Cassie will need
a notification call. His dialysis clinic,[facility] needs a call and dc
summary faxed: [phone] and fax [phone]

## 2021-04-30 NOTE — CATHLAB
[facility]
[address]
[place]   [address]                   INVASIVE PROCEDURE REPORT     
_______________________________________________________________________________
 
Name:       WHITNEY NEAL                Room #:         207-P       ADM IN  
M.R.#:      [id number]                       Account #:      [id number]  
Admission:  04/28/21    Attend Phys:    Arpit Yao MD      
Discharge:              Date of Birth:  [birth year]  
                                                          Report #: [id number]
                                                                    [id number]
_______________________________________________________________________________
THIS REPORT FOR:  
 
cc:  Justin Blackman MD, Ramilo MD Mancuso, Gerald M. MD [facility]                                        
                                                                       ~
 
--------------- APPROVED REPORT --------------
 
 
Study performed:  04/30/2021 07:34:06
 
Patient Details
Patient Status: In-Patient                  Room #: 
The patient is a 66 year-old male
 
Event Personnel
Wes eJffrey RN RN, Dalia Kulkarni RTR, LUPE Blanton, Jeniffer Mchugh, Bart Casey  Interventional Cardiologist
 
Procedures Performed
Art Access - R femoral artery*  Donny Access - R femoral vein  Right 
and Left Heart Cath w/or w/o Coronarie [id number] RL 90624 Initial Mod 
Sed Same Phys/QHP Gr5y [id number] Mod Sed Same Phys/QHP Ea [id number] 
Hemostasis w/ Mynx
 
Indication
Chest pain
 
Procedure Narrative
The Right Groin^ was infiltrated with 1% Lidocaine subcutaneous 
anesthesia. The patient tolerated the procedure well and there were 
no complications associated with the procedure. 
 
Intraoperative Conscious Sedation
Fentanyl   mcg Versed   mg  
 
Fluoro Time:    5.10 minutes    
Dose:     DAP 59975.10 cGycm2    
Contrast Type and Amount:  Omnipaque 130 ml   
 
Hemodynamics
The right atrial mean pressure is 7 mmHg. The right ventricular 
pressure is 40/-1 mmHg. The pulmonary artery pressure is 26/5 mmHg 
with a mean of 15 mmHg. The mean pulmonary capillary wedge pressure 
is 11 mmHg. The aortic pressure is 120/57 mmHg with a mean of 87 
 
 
[facility]
[address]
[place]  [address]
Phone:  [phone]                    INVASIVE PROCEDURE REPORT     
_______________________________________________________________________________
 
Name:            WHITNEY NEAL                Room #:        [facility]       ADM IN
M.R.#:           [id number]          Account #:     [id number]  
Admission:       04/28/21         Attend Phys:   Arpit Yao MD  
Discharge:                  Date of Birth: [birth year]  
                         Report #:      [id number]
        [id number]
_______________________________________________________________________________
mmHg. The left ventricular pressure is 123/5 mmHg with a mean of 
mmHg. The left ventricular end diastolic pressure is 16 mmHg. The 
cardiac output using thermo method is 6.05 L/min. The cardiac index 
using thermo method is 2.83 L/min/m2.
 
Conclusion
#1.  Successful right heart catheterization with cardiac output by 
thermodilution.  See above hemodynamics.
#2 hyperdynamic LV function normal ventricular size EF 65%.
#3 mild disease left main long giving rise to LAD and circumflex.
#4 LAD extends short of the apex with mild irregularities no 
occlusive disease.
#5 nondominant circumflex but moderate in distribution.  The proximal 
circumflex has a heavy area of calcification which may represent a 
prior stent.  Widely patent
#6 large dominant right coronary artery with minimal irregularity 
extensive distribution to the inferior apical wall and inferior 
lateral wall.
 
Recommendations and plan: Continue aggressive risk factor 
modification.  Patient appears to be euvolemic currently undergoing 
dialysis.  No evidence for significant occlusive disease no 
intervention recommended.
 
 
 
 
 
 
 
 
 
 
 
 
 
 
 
 
 
 
 
 
 
  <ELECTRONICALLY SIGNED>
   By: Bart Casey MD, FACC   
  04/30/21     1723
D: 04/30/21 1723                           _____________________________________
T: 04/30/21 1723                           Bart Casey MD, FACC     /INF

## 2021-05-01 VITALS — SYSTOLIC BLOOD PRESSURE: 90 MMHG | DIASTOLIC BLOOD PRESSURE: 57 MMHG

## 2021-05-01 VITALS — DIASTOLIC BLOOD PRESSURE: 57 MMHG | SYSTOLIC BLOOD PRESSURE: 90 MMHG

## 2021-05-01 VITALS — DIASTOLIC BLOOD PRESSURE: 64 MMHG | SYSTOLIC BLOOD PRESSURE: 101 MMHG

## 2021-05-01 VITALS — SYSTOLIC BLOOD PRESSURE: 98 MMHG | DIASTOLIC BLOOD PRESSURE: 59 MMHG

## 2021-05-01 LAB
ANION GAP SERPL CALC-SCNC: 8 MMOL/L (ref 7–16)
BUN SERPL-MCNC: 32 MG/DL (ref 7–18)
CALCIUM SERPL-MCNC: 8.8 MG/DL (ref 8.5–10.1)
CHLORIDE SERPL-SCNC: 98 MMOL/L (ref 98–107)
CO2 SERPL-SCNC: 30 MMOL/L (ref 21–32)
CREAT SERPL-MCNC: 8.4 MG/DL (ref 0.7–1.3)
ERYTHROCYTE [DISTWIDTH] IN BLOOD BY AUTOMATED COUNT: 16.9 % (ref 10.5–14.5)
EST. AVERAGE GLUCOSE BLD GHB EST-MCNC: 105 MG/DL
GLUCOSE SERPL-MCNC: 92 MG/DL (ref 74–106)
GLYCOHEMOGLOBIN (HGB A1C): 5.3
HCT VFR BLD CALC: 38 % (ref 42–52)
HGB BLD-MCNC: 12 GM/DL (ref 14–18)
MAGNESIUM SERPL-MCNC: 2.1 MG/DL (ref 1.8–2.4)
MCH RBC QN AUTO: 26.5 PG (ref 26–34)
MCHC RBC AUTO-ENTMCNC: 31.5 G/DL (ref 28–37)
MCV RBC: 83.9 FL (ref 80–100)
PLATELET # BLD: 197 THOU/UL (ref 150–400)
POTASSIUM SERPL-SCNC: 4.5 MMOL/L (ref 3.5–5.1)
RBC # BLD AUTO: 4.53 MIL/UL (ref 4.5–6)
SODIUM SERPL-SCNC: 136 MMOL/L (ref 136–145)
WBC # BLD AUTO: 8.5 THOU/UL (ref 4–11)

## 2021-05-01 NOTE — NUR
PT CARE ASSUMED AT 0700. ASSESSMENTS AS CHARTED. MEDICATIONS AS CHARTED. LAC
IV. SINUS RHYTHM. ANURIC. PT DID NOT KNOW THE TIME OF HIS LAST BM. JESSIE
FISTULA; DIALYSIS MWF, 1.5 L REMOVED. PT DISCHARGED TO [facility] AT 1330.
DISCHARGE PAPERWORK AND CHART COPY TRAVELED WITH . IV D/C'D.
TELEMETRY D/C'D.